# Patient Record
Sex: FEMALE | Race: WHITE | Employment: OTHER | URBAN - METROPOLITAN AREA
[De-identification: names, ages, dates, MRNs, and addresses within clinical notes are randomized per-mention and may not be internally consistent; named-entity substitution may affect disease eponyms.]

---

## 2024-02-01 ENCOUNTER — APPOINTMENT (OUTPATIENT)
Dept: GENERAL RADIOLOGY | Age: 79
End: 2024-02-01
Attending: EMERGENCY MEDICINE
Payer: MEDICARE

## 2024-02-01 ENCOUNTER — HOSPITAL ENCOUNTER (EMERGENCY)
Age: 79
Discharge: HOME | End: 2024-02-02
Attending: EMERGENCY MEDICINE
Payer: MEDICARE

## 2024-02-01 DIAGNOSIS — S52.532A CLOSED COLLES' FRACTURE OF LEFT RADIUS, INITIAL ENCOUNTER: Primary | ICD-10-CM

## 2024-02-01 PROCEDURE — 25605 CLTX DST RDL FX/EPHYS SEP W/: CPT

## 2024-02-01 PROCEDURE — 73110 X-RAY EXAM OF WRIST: CPT | Performed by: EMERGENCY MEDICINE

## 2024-02-01 PROCEDURE — 73100 X-RAY EXAM OF WRIST: CPT | Performed by: EMERGENCY MEDICINE

## 2024-02-01 PROCEDURE — 99284 EMERGENCY DEPT VISIT MOD MDM: CPT

## 2024-02-01 RX ORDER — HYDROCODONE BITARTRATE AND ACETAMINOPHEN 5; 325 MG/1; MG/1
1 TABLET ORAL ONCE
Status: COMPLETED | OUTPATIENT
Start: 2024-02-01 | End: 2024-02-01

## 2024-02-01 RX ORDER — HYDROCODONE BITARTRATE AND ACETAMINOPHEN 5; 325 MG/1; MG/1
1-2 TABLET ORAL EVERY 6 HOURS PRN
Qty: 10 TABLET | Refills: 0 | Status: SHIPPED | OUTPATIENT
Start: 2024-02-01 | End: 2024-02-06

## 2024-02-02 VITALS
SYSTOLIC BLOOD PRESSURE: 173 MMHG | OXYGEN SATURATION: 97 % | DIASTOLIC BLOOD PRESSURE: 84 MMHG | HEIGHT: 63 IN | RESPIRATION RATE: 19 BRPM | BODY MASS INDEX: 17.72 KG/M2 | WEIGHT: 100 LBS | HEART RATE: 90 BPM

## 2024-02-02 NOTE — DISCHARGE INSTRUCTIONS
Keep your left arm in a short arm splint for immobilization and comfort  Keep your left arm in a sling for comfort  Keep your left arm elevated at rest.  You may use pillows to prop underneath your left arm to keep it elevated  Follow-up with [facility] orth[facility].  Call to make a follow-up appointment  Take ibuprofen every 6 hours as needed for pain.  You may take 1 tablet of Norco every 6 hours as needed for uncontrolled pain  Return to the emergency department for any worsening symptoms or new concerns

## 2024-02-02 NOTE — ED PROVIDER NOTES
Patient Seen in: [facility] In [place]      History     Chief Complaint   Patient presents with    Arm or Hand Injury     Stated Complaint: L wrist fracture confirmed at [facility]    Subjective:   HPI    78-year-old female with a history of hypertension, osteoporosis presents to the emergency department for left wrist injury.  Patient was in [facility] going to the [place].  Patient tripped and fell causing her to fall and injuring her left wrist.  Patient denies any loss of consciousness.  Denies any headache.  Denies any nausea or vomiting.  She denies any anticoagulant use.  She had no complaints of any chest pain or syncope prior to the fall.  Patient went to a local immediate care where she had an x-ray which showed evidence of a distal radial fracture.  She was referred from the immediate care to the emergency department.  After a 5-hour wait, patient contacted her son who then picked her up and drove her back here to the [place] emergency department.    Objective:   Past Medical History:   Diagnosis Date    High blood pressure     Osteoporosis               Past Surgical History:   Procedure Laterality Date    OTHER SURGICAL HISTORY      D&C's     OTHER SURGICAL HISTORY Right     hand surgery d/t fx wrist    TONSILLECTOMY                  Social History     Socioeconomic History    Marital status:    Tobacco Use    Smoking status: Never    Smokeless tobacco: Never   Substance and Sexual Activity    Alcohol use: Yes     Comment: 2 glasses of wine daily    Drug use: Never              Review of Systems    Positive for stated complaint: L wrist fracture confirmed at [facility]  Other systems are as noted in HPI.  Constitutional and vital signs reviewed.      All other systems reviewed and negative except as noted above.    Physical Exam     ED Triage Vitals [02/01/24 2241]   BP (!) 190/93   Pulse 93   Resp 20   Temp    Temp src Oral   SpO2 96 %   O2  Device None (Room air)       Current:BP (!) 173/84   Pulse 90   Resp 19   Ht 160 cm (5' 3\")   Wt 45.4 kg   SpO2 97%   BMI 17.71 kg/m²         Physical Exam    General: Alert and oriented. No acute distress.  HEENT: Normocephalic. No evidence of trauma. Extraocular movements are intact.  Cardiovascular exam: Regular rate and rhythm  Lungs: Clear to auscultation bilaterally.  Abdomen: Soft, nondistended, nontender.  Extremities: No evidence of deformity. No clubbing or cyanosis.  Neuro: No focal deficit is noted.    ED Course   Labs Reviewed - No data to display  History is provided by the patient and son at bedside  Past medical history includes hypertension and osteoporosis  Surgical history includes tonsillectomy  Social history negative for smoking or alcohol abuse  Family history is noncontributory  Differential includes a left wrist sprain versus fracture  Left wrist x-ray shows evidence of a distal radial and ulnar styloid fracture that is consistent with a Colles' fracture on my independent review of the images  Shared decision making with the patient and son at bedside.  Patient will be treated for pain with Norco.  She will then have closed reduction of her left wrist with short arm splint placement and follow-up with orthopedic surgery.  Patient is in agreement with this plan.   I did review the patient's medical chart and could not find images for visual review.  There was a written report.    Left wrist x-rays were ordered to further evaluate.  This did show evidence of a distal radial fracture and ulnar styloid fracture with dorsal angulation of the distal radial fragment consistent with a Colles' fracture.    Findings were discussed with the patient and her son at bedside.  Patient remained neurovascularly intact.  She had a 2+ radial pulse.  Patient was given 1 tablet of Norco for pain control prior to reduction and splint placement.  Procedural note: Patient had Gentle traction applied to the  left wrist to reduce any shortening deformity.  Patient's patient's left wrist was then reduced while applying traction along with initial dorsal angulation followed by reduction of the angular deformity.  Patient tolerated the procedure well.  There is no complications.  She remained neurovascularly intact.  Postreduction x-rays were obtained which showed marked improvement.  Patient will be discharged home in a short arm splint and sling.  She open provided Norco for pain.  Recommend follow-up with orthopedic surgery.         MDM      Patient was screened and evaluated during this visit.   As a treating physician attending to the patient, I determined, within reasonable clinical confidence and prior to discharge, that an emergency medical condition was not or was no longer present.  There was no indication for further evaluation, treatment or admission on an emergency basis.  Comprehensive verbal and written discharge and follow-up instructions were provided to help prevent relapse or worsening.  Patient was instructed to follow-up with her primary care provider for further evaluation and treatment, but to return immediately to the ER for worsening, concerning, new, changing or persisting symptoms.  I discussed the case with the patient and they had no questions, complaints, or concerns.  Patient felt comfortable going home.    ^^Please note that this report has been produced using speech recognition software and may contain errors related to that system including, but not limited to, errors in grammar, punctuation, and spelling, as well as words and phrases that possibly may have been recognized inappropriately.  If there are any questions or concerns, contact the dictating provider for clarification                                   Medical Decision Making      Disposition and Plan     Clinical Impression:  1. Closed Colles' fracture of left radius, initial encounter         Disposition:  Discharge  2/2/2024  12:07 am    Follow-up:  Sara Villa MD  [address]  [address]  [facility] [address]  [phone]    Schedule an appointment as soon as possible for a visit            Medications Prescribed:  Discharge Medication List as of 2/2/2024 12:22 AM        START taking these medications    Details   HYDROcodone-acetaminophen 5-325 MG Oral Tab Take 1-2 tablets by mouth every 6 (six) hours as needed for Pain., Normal, Disp-10 tablet, R-0

## 2024-02-02 NOTE — ED INITIAL ASSESSMENT (HPI)
Patient arrives from [facility] for evaluation of left wrist fracture. She was seen at Immediate care and sent to ER but after waiting 5 hours left. Injury occurred d/t fall